# Patient Record
Sex: FEMALE | Race: WHITE | NOT HISPANIC OR LATINO | URBAN - METROPOLITAN AREA
[De-identification: names, ages, dates, MRNs, and addresses within clinical notes are randomized per-mention and may not be internally consistent; named-entity substitution may affect disease eponyms.]

---

## 2017-03-14 ENCOUNTER — CHARTING TRANS (OUTPATIENT)
Dept: FAMILY MEDICINE | Age: 42
End: 2017-03-14

## 2017-03-15 ENCOUNTER — LAB SERVICES (OUTPATIENT)
Dept: OTHER | Age: 42
End: 2017-03-15

## 2017-03-15 LAB
25(OH)D3 SERPL-MCNC: 28.6 NG/ML (ref 30–100)
TSH SERPL DL<=0.05 MIU/L-ACNC: 1.06 M[IU]/L (ref 0.3–4.82)

## 2017-03-16 ENCOUNTER — LAB SERVICES (OUTPATIENT)
Dept: OTHER | Age: 42
End: 2017-03-16

## 2017-03-16 LAB
ALBUMIN SERPL BCG-MCNC: 4.3 G/DL (ref 3.6–5.1)
ALP SERPL-CCNC: 71 U/L (ref 45–105)
ALT SERPL W/O P-5'-P-CCNC: 33 U/L (ref 15–43)
AST SERPL-CCNC: 27 U/L (ref 14–43)
BILIRUB SERPL-MCNC: 0.4 MG/DL (ref 0–1.3)
BUN SERPL-MCNC: 16 MG/DL (ref 7–20)
CALCIUM SERPL-MCNC: 9.2 MG/DL (ref 8.6–10.6)
CHLORIDE SERPL-SCNC: 106 MMOL/L (ref 96–107)
CREATININE, SERUM: 0.7 MG/DL (ref 0.5–1.4)
DIFFERENTIAL TYPE: ABNORMAL
GFR SERPL CREATININE-BSD FRML MDRD: >60 ML/MIN/{1.73M2}
GFR SERPL CREATININE-BSD FRML MDRD: >60 ML/MIN/{1.73M2}
GLUCOSE SERPL-MCNC: 121 MG/DL (ref 70–200)
HCO3 SERPL-SCNC: 23 MMOL/L (ref 22–32)
HEMATOCRIT: 29.6 % (ref 34–45)
HEMOGLOBIN: 8.8 G/DL (ref 11.2–15.7)
LYMPH PERCENT: 47.1 % (ref 20.5–51.1)
LYMPHOCYTE ABSOLUTE #: 1.8 10*3/UL (ref 1.2–3.4)
MEAN CORPUSCULAR HGB CONCENTRATION: 29.7 % (ref 32–36)
MEAN CORPUSCULAR HGB: 21.3 PG (ref 27–34)
MEAN CORPUSCULAR VOLUME: 71.5 FL (ref 79–95)
MEAN PLATELET VOLUME: 10.2 FL (ref 8.6–12.4)
MIXED %: 8.5 % (ref 4.3–12.9)
MIXED ABSOLUTE #: 0.3 10*3/UL (ref 0.2–0.9)
NEUTROPHIL ABSOLUTE #: 1.8 10*3/UL (ref 1.4–6.5)
NEUTROPHIL PERCENT: 44.4 % (ref 34–73.5)
PLATELET COUNT: 272 10*3/UL (ref 150–400)
POTASSIUM SERPL-SCNC: 4.3 MMOL/L (ref 3.5–5.3)
PROT SERPL-MCNC: 7.4 G/DL (ref 6.4–8.5)
RED BLOOD CELL COUNT: 4.14 10*6/UL (ref 3.7–5.2)
RED CELL DISTRIBUTION WIDTH: 15.1 % (ref 11.3–14.8)
SODIUM SERPL-SCNC: 143 MMOL/L (ref 136–146)
WHITE BLOOD CELL COUNT: 3.9 10*3/UL (ref 4–10)

## 2017-03-20 LAB — FINAL REPORT: NORMAL

## 2017-03-22 LAB — APPEARANCE SPEC: NORMAL

## 2017-05-09 ENCOUNTER — CHARTING TRANS (OUTPATIENT)
Dept: OTHER | Age: 42
End: 2017-05-09

## 2017-05-20 ENCOUNTER — OFFICE VISIT (OUTPATIENT)
Dept: RETAIL CLINIC | Facility: CLINIC | Age: 42
End: 2017-05-20

## 2017-05-20 VITALS
TEMPERATURE: 98.1 F | OXYGEN SATURATION: 99 % | HEART RATE: 92 BPM | RESPIRATION RATE: 16 BRPM | HEIGHT: 71 IN | WEIGHT: 160 LBS | BODY MASS INDEX: 22.4 KG/M2

## 2017-05-20 DIAGNOSIS — J40 BRONCHITIS: Primary | ICD-10-CM

## 2017-05-20 PROCEDURE — 99203 OFFICE O/P NEW LOW 30 MIN: CPT | Performed by: NURSE PRACTITIONER

## 2017-05-20 RX ORDER — SERTRALINE HYDROCHLORIDE 100 MG/1
100 TABLET, FILM COATED ORAL DAILY
COMMUNITY

## 2017-05-20 RX ORDER — BENZONATATE 100 MG/1
100 CAPSULE ORAL 3 TIMES DAILY PRN
Qty: 30 CAPSULE | Refills: 0 | Status: SHIPPED | OUTPATIENT
Start: 2017-05-20 | End: 2017-05-30

## 2017-05-20 RX ORDER — AMOXICILLIN 875 MG/1
875 TABLET, COATED ORAL 2 TIMES DAILY
Qty: 20 TABLET | Refills: 0 | Status: SHIPPED | OUTPATIENT
Start: 2017-05-20 | End: 2017-05-30

## 2017-06-21 ENCOUNTER — CHARTING TRANS (OUTPATIENT)
Dept: OTHER | Age: 42
End: 2017-06-21

## 2017-07-13 ENCOUNTER — IMAGING SERVICES (OUTPATIENT)
Dept: OTHER | Age: 42
End: 2017-07-13

## 2017-07-17 ENCOUNTER — CHARTING TRANS (OUTPATIENT)
Dept: OTHER | Age: 42
End: 2017-07-17

## 2017-07-20 ENCOUNTER — MYAURORA ACCOUNT LINK (OUTPATIENT)
Dept: OTHER | Age: 42
End: 2017-07-20

## 2017-07-20 ENCOUNTER — CHARTING TRANS (OUTPATIENT)
Dept: SURGERY | Age: 42
End: 2017-07-20

## 2017-09-14 ENCOUNTER — CHARTING TRANS (OUTPATIENT)
Dept: OTHER | Age: 42
End: 2017-09-14

## 2017-10-08 ENCOUNTER — CHARTING TRANS (OUTPATIENT)
Dept: OTHER | Age: 42
End: 2017-10-08

## 2017-10-16 ENCOUNTER — CHARTING TRANS (OUTPATIENT)
Dept: OTHER | Age: 42
End: 2017-10-16

## 2017-10-17 ENCOUNTER — LAB SERVICES (OUTPATIENT)
Dept: OTHER | Age: 42
End: 2017-10-17

## 2017-10-17 ENCOUNTER — MYAURORA ACCOUNT LINK (OUTPATIENT)
Dept: OTHER | Age: 42
End: 2017-10-17

## 2017-10-17 ENCOUNTER — CHARTING TRANS (OUTPATIENT)
Dept: OBGYN | Age: 42
End: 2017-10-17

## 2017-10-17 LAB
BASOPHIL %: 0.6 % (ref 0–1.2)
BASOPHIL ABSOLUTE #: 0 10*3/UL (ref 0–0.1)
DIFFERENTIAL TYPE: ABNORMAL
EOSINOPHIL %: 1.1 % (ref 0–10)
EOSINOPHIL ABSOLUTE #: 0.1 10*3/UL (ref 0–0.5)
FERRITIN SERPL-MCNC: 5 NG/ML (ref 6–137)
HEMATOCRIT: 33 % (ref 34–45)
HEMOGLOBIN: 10.2 G/DL (ref 11.2–15.7)
IRON SATN MFR SERPL: 3 % (ref 9–55)
IRON SERPL-MCNC: 16 UG/DL (ref 37–170)
LYMPH PERCENT: 39.8 % (ref 20.5–51.1)
LYMPHOCYTE ABSOLUTE #: 2.1 10*3/UL (ref 1.2–3.4)
MEAN CORPUSCULAR HGB CONCENTRATION: 30.9 % (ref 32–36)
MEAN CORPUSCULAR HGB: 24.1 PG (ref 27–34)
MEAN CORPUSCULAR VOLUME: 77.8 FL (ref 79–95)
MEAN PLATELET VOLUME: 10.9 FL (ref 8.6–12.4)
MONOCYTE ABSOLUTE #: 0.5 10*3/UL (ref 0.2–0.9)
MONOCYTE PERCENT: 8.9 % (ref 4.3–12.9)
NEUTROPHIL ABSOLUTE #: 2.7 10*3/UL (ref 1.4–6.5)
NEUTROPHIL PERCENT: 49.6 % (ref 34–73.5)
PLATELET COUNT: 285 10*3/UL (ref 150–400)
RED BLOOD CELL COUNT: 4.24 10*6/UL (ref 3.7–5.2)
RED CELL DISTRIBUTION WIDTH: 14.6 % (ref 11.3–14.8)
TIBC SERPL-MCNC: 506 UG/DL (ref 250–450)
WHITE BLOOD CELL COUNT: 5.4 10*3/UL (ref 4–10)

## 2017-10-19 ENCOUNTER — CHARTING TRANS (OUTPATIENT)
Dept: OTHER | Age: 42
End: 2017-10-19

## 2017-11-15 LAB — PAP TEST, THIN PREP (ACL): NORMAL

## 2017-11-21 LAB — HPV I/H RISK 4 DNA CVX QL NAA+PROBE: NORMAL

## 2018-01-08 ENCOUNTER — CHARTING TRANS (OUTPATIENT)
Dept: OTHER | Age: 43
End: 2018-01-08

## 2018-01-15 ENCOUNTER — LAB SERVICES (OUTPATIENT)
Dept: OTHER | Age: 43
End: 2018-01-15

## 2018-01-15 ENCOUNTER — CHARTING TRANS (OUTPATIENT)
Dept: OTHER | Age: 43
End: 2018-01-15

## 2018-01-15 LAB
DIFFERENTIAL TYPE: ABNORMAL
FERRITIN SERPL-MCNC: 4 NG/ML (ref 6–137)
HEMATOCRIT: 25.9 % (ref 34–45)
HEMOGLOBIN: 8.1 G/DL (ref 11.2–15.7)
IRON SATN MFR SERPL: 4 % (ref 9–55)
IRON SERPL-MCNC: 19 UG/DL (ref 37–170)
LYMPH PERCENT: 36.9 % (ref 20.5–51.1)
LYMPHOCYTE ABSOLUTE #: 1.8 10*3/UL (ref 1.2–3.4)
MEAN CORPUSCULAR HGB CONCENTRATION: 31.3 % (ref 32–36)
MEAN CORPUSCULAR HGB: 24.1 PG (ref 27–34)
MEAN CORPUSCULAR VOLUME: 77.1 FL (ref 79–95)
MEAN PLATELET VOLUME: 9.3 FL (ref 8.6–12.4)
MIXED %: 7 % (ref 4.3–12.9)
MIXED ABSOLUTE #: 0.3 10*3/UL (ref 0.2–0.9)
NEUTROPHIL ABSOLUTE #: 2.7 10*3/UL (ref 1.4–6.5)
NEUTROPHIL PERCENT: 56.1 % (ref 34–73.5)
PLATELET COUNT: 395 10*3/UL (ref 150–400)
RED BLOOD CELL COUNT: 3.36 10*6/UL (ref 3.7–5.2)
RED CELL DISTRIBUTION WIDTH: 14 % (ref 11.3–14.8)
TIBC SERPL-MCNC: 519 UG/DL (ref 250–450)
WHITE BLOOD CELL COUNT: 4.8 10*3/UL (ref 4–10)

## 2018-01-16 ENCOUNTER — CHARTING TRANS (OUTPATIENT)
Dept: OTHER | Age: 43
End: 2018-01-16

## 2018-02-06 ENCOUNTER — CHARTING TRANS (OUTPATIENT)
Dept: OTHER | Age: 43
End: 2018-02-06

## 2018-02-12 ENCOUNTER — CHARTING TRANS (OUTPATIENT)
Dept: OTHER | Age: 43
End: 2018-02-12

## 2018-02-14 ENCOUNTER — LAB SERVICES (OUTPATIENT)
Dept: OTHER | Age: 43
End: 2018-02-14

## 2018-02-14 ENCOUNTER — CHARTING TRANS (OUTPATIENT)
Dept: OTHER | Age: 43
End: 2018-02-14

## 2018-02-14 LAB
AMPHETAMINES, URINE, QUAL: NEGATIVE
APPEARANCE: ABNORMAL
BARBITURATES, URINE, QUAL: NEGATIVE
BASOPHIL AUTOMATED: 0.8 %
BASOPHILS: 0 (ref 0–0.2)
BENZODIAZEPINES, URINE, QUAL: NEGATIVE
BILIRUBIN: ABNORMAL
CANNABINOIDS, URINE, QUAL: NEGATIVE
COCAINE, URINE, QUAL: NEGATIVE
COLOR: YELLOW
D-DIMER, QUANTITATIVE: 1052 NG/MLFEU (ref 0–622)
EOSINOPHIL AUTOMATED: 0.4 %
EOSINOPHILS: 0 (ref 0–0.5)
GLUCOSE, URINE, AUTOMATED: ABNORMAL MG/DL
HEMATOCRIT: 27.3 % (ref 34.7–45.1)
HEMOGLOBIN: 8.3 GM/DL (ref 12–15.3)
KETONES, URINE, AUTOMATED: 20 MG/DL
LEUKOCYTE, URINE, AUTOMATED: ABNORMAL
LYMPHOCYTE AUTOMATED: 30.4 %
LYMPHOCYTES: 1.4 (ref 0.6–3.4)
MEAN CORPUSCULAR HGB: 21.1 PG (ref 26–34)
MEAN CORPUSCULAR HGB: 30.2 G/DL (ref 32.5–35.8)
MEAN CORPUSCULAR VOL: 69.9 FL (ref 80–100)
MEAN PLATELET VOLUME: 8.4 FL (ref 6.8–10.2)
MONOCYTE AUTOMATED: 6 %
MONOCYTES: 0.3 (ref 0.3–1)
NEUTROPHIL ABSOLUTE: 3 (ref 1.7–7.7)
NEUTROPHIL AUTOMATED: 62.4 %
NITRITE, URINE AUTOMATED: NEGATIVE
OPIATES, URINE, QUAL: NEGATIVE
PH, URINE: 5 (ref 5–8)
PHENCYCLIDINE, URINE, QUAL: NEGATIVE
PLATELET COUNT: 331 K/MM3 (ref 150–450)
PROTEIN, URINE: 30 MG/DL
RED BLOOD CELL COUNT: 3.91 M/MM3 (ref 3.63–5.04)
RED CELL DISTRIBUTIO: 17.4 % (ref 11.9–15.9)
SLIDE REVIEW COMMENT: ABNORMAL
SPECIFIC GRAVITY UA: 1.03 (ref 1–1.03)
TROPONIN I (TROP): < 0.03 NG/ML
TROPONIN I (TROP): < 0.03 NG/ML
URINE, BLOOD, AUTOMATED: ABNORMAL
UROBILINOGEN, URINE, AUTOMATED: ABNORMAL MG/DL
WHITE BLOOD CELL COU: 4.7 K/MM3 (ref 4–11)

## 2018-02-16 ENCOUNTER — CHARTING TRANS (OUTPATIENT)
Dept: OTHER | Age: 43
End: 2018-02-16

## 2018-02-19 ENCOUNTER — CHARTING TRANS (OUTPATIENT)
Dept: OTHER | Age: 43
End: 2018-02-19

## 2018-02-20 ENCOUNTER — LAB SERVICES (OUTPATIENT)
Dept: OTHER | Age: 43
End: 2018-02-20

## 2018-02-20 ENCOUNTER — MYAURORA ACCOUNT LINK (OUTPATIENT)
Dept: OTHER | Age: 43
End: 2018-02-20

## 2018-02-20 ENCOUNTER — CHARTING TRANS (OUTPATIENT)
Dept: OTHER | Age: 43
End: 2018-02-20

## 2018-02-20 LAB
DIFFERENTIAL TYPE: ABNORMAL
FERRITIN SERPL-MCNC: 7 NG/ML (ref 6–137)
HEMATOCRIT: 33.7 % (ref 34–45)
HEMOGLOBIN: 10 G/DL (ref 11.2–15.7)
IRON SATN MFR SERPL: 2 % (ref 9–55)
IRON SERPL-MCNC: 13 UG/DL (ref 37–170)
LYMPH PERCENT: 43.3 % (ref 20.5–51.1)
LYMPHOCYTE ABSOLUTE #: 1.8 10*3/UL (ref 1.2–3.4)
MEAN CORPUSCULAR HGB CONCENTRATION: 29.7 % (ref 32–36)
MEAN CORPUSCULAR HGB: 21.7 PG (ref 27–34)
MEAN CORPUSCULAR VOLUME: 73.1 FL (ref 79–95)
MEAN PLATELET VOLUME: 9.8 FL (ref 8.6–12.4)
MIXED %: 7.1 % (ref 4.3–12.9)
MIXED ABSOLUTE #: 0.3 10*3/UL (ref 0.2–0.9)
NEUTROPHIL ABSOLUTE #: 2 10*3/UL (ref 1.4–6.5)
NEUTROPHIL PERCENT: 49.6 % (ref 34–73.5)
PLATELET COUNT: 333 10*3/UL (ref 150–400)
RED BLOOD CELL COUNT: 4.61 10*6/UL (ref 3.7–5.2)
RED CELL DISTRIBUTION WIDTH: 14.6 % (ref 11.3–14.8)
TIBC SERPL-MCNC: 524 UG/DL (ref 250–450)
WHITE BLOOD CELL COUNT: 4.1 10*3/UL (ref 4–10)

## 2018-02-22 ENCOUNTER — LAB SERVICES (OUTPATIENT)
Dept: OTHER | Age: 43
End: 2018-02-22

## 2018-02-22 ENCOUNTER — MYAURORA ACCOUNT LINK (OUTPATIENT)
Dept: OTHER | Age: 43
End: 2018-02-22

## 2018-02-22 LAB — PREGNANCY TEST, URINE: NEGATIVE

## 2018-02-26 ENCOUNTER — CHARTING TRANS (OUTPATIENT)
Dept: OTHER | Age: 43
End: 2018-02-26

## 2018-02-26 LAB — AP REPORT: NORMAL

## 2018-03-03 ENCOUNTER — LAB SERVICES (OUTPATIENT)
Dept: OTHER | Age: 43
End: 2018-03-03

## 2018-03-03 LAB
DIFFERENTIAL TYPE: ABNORMAL
HEMATOCRIT: 32.3 % (ref 34–45)
HEMOGLOBIN: 9.7 G/DL (ref 11.2–15.7)
LYMPH PERCENT: 40.7 % (ref 20.5–51.1)
LYMPHOCYTE ABSOLUTE #: 1.8 10*3/UL (ref 1.2–3.4)
MEAN CORPUSCULAR HGB CONCENTRATION: 30 % (ref 32–36)
MEAN CORPUSCULAR HGB: 22.1 PG (ref 27–34)
MEAN CORPUSCULAR VOLUME: 73.6 FL (ref 79–95)
MEAN PLATELET VOLUME: 9.8 FL (ref 8.6–12.4)
MIXED %: 8.2 % (ref 4.3–12.9)
MIXED ABSOLUTE #: 0.4 10*3/UL (ref 0.2–0.9)
NEUTROPHIL ABSOLUTE #: 2.2 10*3/UL (ref 1.4–6.5)
NEUTROPHIL PERCENT: 51.1 % (ref 34–73.5)
PLATELET COUNT: 325 10*3/UL (ref 150–400)
RED BLOOD CELL COUNT: 4.39 10*6/UL (ref 3.7–5.2)
RED CELL DISTRIBUTION WIDTH: 16.4 % (ref 11.3–14.8)
WHITE BLOOD CELL COUNT: 4.4 10*3/UL (ref 4–10)

## 2018-03-07 ENCOUNTER — OFFICE VISIT (OUTPATIENT)
Dept: SURGERY | Facility: CLINIC | Age: 43
End: 2018-03-07

## 2018-03-07 ENCOUNTER — CHARTING TRANS (OUTPATIENT)
Dept: OTHER | Age: 43
End: 2018-03-07

## 2018-03-07 VITALS — WEIGHT: 165 LBS | TEMPERATURE: 99 F | BODY MASS INDEX: 23.1 KG/M2 | HEART RATE: 68 BPM | HEIGHT: 71 IN

## 2018-03-07 DIAGNOSIS — K60.0 ACUTE ANAL FISSURE: Primary | ICD-10-CM

## 2018-03-07 DIAGNOSIS — K64.4 EXTERNAL HEMORRHOID: ICD-10-CM

## 2018-03-07 DIAGNOSIS — K62.5 RECTAL BLEEDING: ICD-10-CM

## 2018-03-07 DIAGNOSIS — K64.2 GRADE III INTERNAL HEMORRHOIDS: ICD-10-CM

## 2018-03-07 PROCEDURE — 99203 OFFICE O/P NEW LOW 30 MIN: CPT | Performed by: SURGERY

## 2018-03-07 RX ORDER — IRON 18 MG
TABLET ORAL
COMMUNITY

## 2018-03-07 RX ORDER — DESOGESTREL AND ETHINYL ESTRADIOL AND ETHINYL ESTRADIOL 21-5 (28)
KIT ORAL
Refills: 11 | COMMUNITY
Start: 2018-02-24

## 2018-03-07 NOTE — H&P
New Patient Visit Note       Active Problems      1. Acute anal fissure    2. External hemorrhoid    3. Grade III internal hemorrhoids    4.  Rectal bleeding        Chief Complaint   Patient presents with:  Hemorrhoids: New patient referred by Raad Jaquez for today.     Family History   Problem Relation Age of Onset   • Heart Disorder Maternal Grandmother      aortic aneurysm   • Heart Disorder Maternal Grandfather      aortic aneurysm     Social History    Marital status:              Spouse name: bruise/bleed easily. Psychiatric/Behavioral: Negative for behavioral problems and sleep disturbance.        Physical Findings   Pulse 68   Temp 98.7 °F (37.1 °C) (Oral)   Ht 71\"   Wt 165 lb   BMI 23.01 kg/m²   Physical Exam   Constitutional: She is orien ano  No Anterior Fissure  Rectal exam with positive internal hemorrhoids       Lymphadenopathy:     She has no cervical adenopathy. Right cervical: No superficial cervical and no deep cervical adenopathy present.        Left cervical: No superficial

## 2018-03-08 ENCOUNTER — CHARTING TRANS (OUTPATIENT)
Dept: OTHER | Age: 43
End: 2018-03-08

## 2018-03-10 ENCOUNTER — CHARTING TRANS (OUTPATIENT)
Dept: OTHER | Age: 43
End: 2018-03-10

## 2018-04-04 ENCOUNTER — OFFICE VISIT (OUTPATIENT)
Dept: SURGERY | Facility: CLINIC | Age: 43
End: 2018-04-04

## 2018-04-04 VITALS — HEART RATE: 72 BPM | WEIGHT: 165 LBS | HEIGHT: 71 IN | BODY MASS INDEX: 23.1 KG/M2

## 2018-04-04 DIAGNOSIS — K60.0 ACUTE ANAL FISSURE: Primary | ICD-10-CM

## 2018-04-04 DIAGNOSIS — K62.5 RECTAL BLEEDING: ICD-10-CM

## 2018-04-04 DIAGNOSIS — K64.2 GRADE III INTERNAL HEMORRHOIDS: ICD-10-CM

## 2018-04-04 PROCEDURE — 99213 OFFICE O/P EST LOW 20 MIN: CPT | Performed by: SURGERY

## 2018-04-04 NOTE — PROGRESS NOTES
Follow Up Visit Note       Active Problems      1. Acute anal fissure    2. Grade III internal hemorrhoids    3. Rectal bleeding          Chief Complaint   Patient presents with:  Anal / Rectal Problem: 3 week follow up---anal fissure.  c/o rectal bleeding Ferrous Sulfate (IRON) 90 (18 Fe) MG Oral Tab Take by mouth. Disp:  Rfl:    Nitroglycerin 0.4 % Rectal Ointment Apply pea sized amount BID -TID Disp: 1 Tube Rfl: 0        Review of Systems  The Review of Systems has been reviewed by me during today.   Rev Skin: Skin is warm and dry. Psychiatric: She has a normal mood and affect. Her behavior is normal. Judgment and thought content normal.   Nursing note and vitals reviewed.            Assessment   Acute anal fissure  (primary encounter diagnosis)  Grade

## 2018-05-16 ENCOUNTER — CHARTING TRANS (OUTPATIENT)
Dept: OTHER | Age: 43
End: 2018-05-16

## 2018-06-04 ENCOUNTER — OFFICE VISIT (OUTPATIENT)
Dept: SURGERY | Facility: CLINIC | Age: 43
End: 2018-06-04

## 2018-06-04 DIAGNOSIS — K64.2 GRADE III INTERNAL HEMORRHOIDS: Primary | ICD-10-CM

## 2018-06-04 DIAGNOSIS — K64.4 EXTERNAL HEMORRHOID: ICD-10-CM

## 2018-06-04 DIAGNOSIS — K62.5 RECTAL BLEEDING: ICD-10-CM

## 2018-06-04 PROCEDURE — 99212 OFFICE O/P EST SF 10 MIN: CPT | Performed by: SURGERY

## 2018-06-04 NOTE — PROGRESS NOTES
Follow Up Visit Note       Active Problems      1. Grade III internal hemorrhoids    2. External hemorrhoid    3.  Rectal bleeding          Chief Complaint   Patient presents with:  Pre-Op: pre op visit--scheduled for hemorrhoidectomy on 6/8/18. question/co (ACIDOPHILUS/GOAT MILK) Oral Cap Take by mouth. Disp:  Rfl:    Ferrous Sulfate (IRON) 90 (18 Fe) MG Oral Tab Take by mouth.  Disp:  Rfl:    Nitroglycerin 0.4 % Rectal Ointment Apply pea sized amount BID -TID Disp: 1 Tube Rfl: 0        Review of Systems  The alternatives to excisional hemorrhoidectomy. The risks included, but are not limited to, excessive post-operative pain, bleeding, constipation, and recurrent disease. All of her questions were answered, and she is agreeable to proceed with surgery.     Fo

## 2018-06-06 ENCOUNTER — CHARTING TRANS (OUTPATIENT)
Dept: OTHER | Age: 43
End: 2018-06-06

## 2018-06-06 ENCOUNTER — MYAURORA ACCOUNT LINK (OUTPATIENT)
Dept: OTHER | Age: 43
End: 2018-06-06

## 2018-06-08 PROCEDURE — 88304 TISSUE EXAM BY PATHOLOGIST: CPT | Performed by: SURGERY

## 2018-06-12 ENCOUNTER — TELEPHONE (OUTPATIENT)
Dept: SURGERY | Facility: CLINIC | Age: 43
End: 2018-06-12

## 2018-06-12 NOTE — TELEPHONE ENCOUNTER
Patient left message on office voice mail regarding lack of bowel movement after surgery and possible enema. Patient had hemorrhoidectomy done 6/8/18 with Dr. Og Ruiz. Called patient twice and left message to call the office regarding voicemail.  Patient can

## 2018-06-13 ENCOUNTER — TELEPHONE (OUTPATIENT)
Dept: SURGERY | Facility: CLINIC | Age: 43
End: 2018-06-13

## 2018-06-13 RX ORDER — ONDANSETRON 4 MG/1
4 TABLET, FILM COATED ORAL EVERY 8 HOURS PRN
Qty: 20 TABLET | Refills: 0 | Status: SHIPPED | OUTPATIENT
Start: 2018-06-13

## 2018-06-13 NOTE — TELEPHONE ENCOUNTER
Pt had hemorrhoidectomy on 6/8 and is still having severe pain, nausea and fatigue. Pt admits that she was using miralx, MOM and used an enema to have a BM yesterday.  Instructed pt not to do enemas, to use stool softener, push fluids, try freezing prep H w

## 2018-07-16 ENCOUNTER — TELEPHONE (OUTPATIENT)
Dept: SURGERY | Facility: CLINIC | Age: 43
End: 2018-07-16

## 2018-07-16 NOTE — TELEPHONE ENCOUNTER
6/8 hemorrhoidectomy calling to make an appt, offered 7/25, pt states she is moving at the end of the week. States she feels fine and declined appt. Pt to call office with any issues.

## 2018-09-10 ENCOUNTER — CHARTING TRANS (OUTPATIENT)
Dept: OTHER | Age: 43
End: 2018-09-10

## 2018-10-08 ENCOUNTER — CHARTING TRANS (OUTPATIENT)
Dept: OTHER | Age: 43
End: 2018-10-08

## 2018-11-23 ENCOUNTER — IMAGING SERVICES (OUTPATIENT)
Dept: OTHER | Age: 43
End: 2018-11-23

## 2018-11-28 VITALS
WEIGHT: 165 LBS | HEIGHT: 71 IN | SYSTOLIC BLOOD PRESSURE: 114 MMHG | TEMPERATURE: 97.9 F | DIASTOLIC BLOOD PRESSURE: 86 MMHG | BODY MASS INDEX: 23.1 KG/M2

## 2018-11-28 VITALS
WEIGHT: 161 LBS | DIASTOLIC BLOOD PRESSURE: 70 MMHG | HEART RATE: 70 BPM | TEMPERATURE: 96.6 F | SYSTOLIC BLOOD PRESSURE: 102 MMHG | RESPIRATION RATE: 20 BRPM

## 2018-11-28 VITALS
BODY MASS INDEX: 23.66 KG/M2 | WEIGHT: 169 LBS | DIASTOLIC BLOOD PRESSURE: 70 MMHG | SYSTOLIC BLOOD PRESSURE: 118 MMHG | HEIGHT: 71 IN

## 2019-01-24 ENCOUNTER — IMAGING SERVICES (OUTPATIENT)
Dept: OTHER | Age: 44
End: 2019-01-24

## 2019-03-06 VITALS
OXYGEN SATURATION: 100 % | SYSTOLIC BLOOD PRESSURE: 100 MMHG | HEIGHT: 71 IN | BODY MASS INDEX: 22.26 KG/M2 | DIASTOLIC BLOOD PRESSURE: 70 MMHG | TEMPERATURE: 97.2 F | HEART RATE: 88 BPM | WEIGHT: 159 LBS | RESPIRATION RATE: 16 BRPM

## 2019-03-06 VITALS — WEIGHT: 159 LBS | BODY MASS INDEX: 22.18 KG/M2

## 2019-03-06 VITALS
SYSTOLIC BLOOD PRESSURE: 118 MMHG | WEIGHT: 165 LBS | RESPIRATION RATE: 16 BRPM | DIASTOLIC BLOOD PRESSURE: 70 MMHG | HEART RATE: 66 BPM | TEMPERATURE: 96 F | BODY MASS INDEX: 23.01 KG/M2

## 2019-03-06 VITALS
DIASTOLIC BLOOD PRESSURE: 70 MMHG | HEIGHT: 71 IN | BODY MASS INDEX: 23.24 KG/M2 | WEIGHT: 166 LBS | HEART RATE: 76 BPM | SYSTOLIC BLOOD PRESSURE: 124 MMHG

## 2019-04-16 ENCOUNTER — TELEPHONE (OUTPATIENT)
Dept: SCHEDULING | Age: 44
End: 2019-04-16

## 2019-04-21 ENCOUNTER — TELEPHONE (OUTPATIENT)
Dept: SCHEDULING | Age: 44
End: 2019-04-21